# Patient Record
Sex: MALE | Race: OTHER | NOT HISPANIC OR LATINO
[De-identification: names, ages, dates, MRNs, and addresses within clinical notes are randomized per-mention and may not be internally consistent; named-entity substitution may affect disease eponyms.]

---

## 2019-10-11 ENCOUNTER — APPOINTMENT (OUTPATIENT)
Dept: UROLOGY | Facility: CLINIC | Age: 69
End: 2019-10-11
Payer: MEDICARE

## 2019-10-11 VITALS
SYSTOLIC BLOOD PRESSURE: 130 MMHG | DIASTOLIC BLOOD PRESSURE: 80 MMHG | BODY MASS INDEX: 23.7 KG/M2 | WEIGHT: 160 LBS | HEIGHT: 69 IN

## 2019-10-11 DIAGNOSIS — Z80.42 FAMILY HISTORY OF MALIGNANT NEOPLASM OF PROSTATE: ICD-10-CM

## 2019-10-11 DIAGNOSIS — Z80.43 FAMILY HISTORY OF MALIGNANT NEOPLASM OF TESTIS: ICD-10-CM

## 2019-10-11 DIAGNOSIS — Z80.8 FAMILY HISTORY OF MALIGNANT NEOPLASM OF OTHER ORGANS OR SYSTEMS: ICD-10-CM

## 2019-10-11 DIAGNOSIS — Z78.9 OTHER SPECIFIED HEALTH STATUS: ICD-10-CM

## 2019-10-11 PROBLEM — Z00.00 ENCOUNTER FOR PREVENTIVE HEALTH EXAMINATION: Status: ACTIVE | Noted: 2019-10-11

## 2019-10-11 PROCEDURE — 99202 OFFICE O/P NEW SF 15 MIN: CPT

## 2019-10-14 ENCOUNTER — FORM ENCOUNTER (OUTPATIENT)
Age: 69
End: 2019-10-14

## 2019-10-15 ENCOUNTER — APPOINTMENT (OUTPATIENT)
Dept: ULTRASOUND IMAGING | Facility: CLINIC | Age: 69
End: 2019-10-15
Payer: MEDICARE

## 2019-10-15 ENCOUNTER — OUTPATIENT (OUTPATIENT)
Dept: OUTPATIENT SERVICES | Facility: HOSPITAL | Age: 69
LOS: 1 days | End: 2019-10-15

## 2019-10-15 PROCEDURE — 93975 VASCULAR STUDY: CPT | Mod: 26

## 2019-10-15 PROCEDURE — 76870 US EXAM SCROTUM: CPT | Mod: 26

## 2019-10-18 ENCOUNTER — APPOINTMENT (OUTPATIENT)
Dept: UROLOGY | Facility: CLINIC | Age: 69
End: 2019-10-18

## 2019-10-18 LAB
PSA FREE FLD-MCNC: 11 %
PSA FREE SERPL-MCNC: 2.24 NG/ML
PSA SERPL-MCNC: 20.4 NG/ML

## 2019-10-30 ENCOUNTER — FORM ENCOUNTER (OUTPATIENT)
Age: 69
End: 2019-10-30

## 2019-10-31 ENCOUNTER — APPOINTMENT (OUTPATIENT)
Dept: MRI IMAGING | Facility: HOSPITAL | Age: 69
End: 2019-10-31
Payer: MEDICARE

## 2019-10-31 ENCOUNTER — OUTPATIENT (OUTPATIENT)
Dept: OUTPATIENT SERVICES | Facility: HOSPITAL | Age: 69
LOS: 1 days | End: 2019-10-31
Payer: MEDICARE

## 2019-10-31 PROCEDURE — A9585: CPT

## 2019-10-31 PROCEDURE — 72197 MRI PELVIS W/O & W/DYE: CPT

## 2019-10-31 PROCEDURE — 72197 MRI PELVIS W/O & W/DYE: CPT | Mod: 26

## 2019-11-15 ENCOUNTER — APPOINTMENT (OUTPATIENT)
Dept: UROLOGY | Facility: CLINIC | Age: 69
End: 2019-11-15
Payer: MEDICARE

## 2019-11-15 PROCEDURE — 99213 OFFICE O/P EST LOW 20 MIN: CPT

## 2019-11-15 NOTE — HISTORY OF PRESENT ILLNESS
[FreeTextEntry1] : 69 year old retired  \par s.p urolift Dr Balwinder Diez\par Has helped him tremendously\par commplaining of left testicular swelling\par 4-5 x bigger than rigtht testicle\par not painful; stable for months\par \par INTERPRETOR BA [id number]\par \par 11.15.2019\par interpretor yinka [id number]\par \par

## 2019-11-15 NOTE — ASSESSMENT
[FreeTextEntry1] : MRI 74.3 cc\par PSA density is 0.27\par reviewed history \par last biopsy 8 months ago PSA at that time 15 or 16\par Needs records from Dr Chambers\par \par 1. repeat PSA if similar to PSA at time of biopsy continue to monitor\par 2. if PSA has risen from PSA at time of biopsy will repeat\par 3. We discussed the risks and complications of prostate biopsy and discussed the procedure.\par We discussed that procedure is performed by placing a rectal probe and administering anesthesia locally.\par Biopsies are then taken with a needle. Multiple biopsies are taken, \par Risks of the procedure include hematuria, hematospermia, blood per rectum.\par Risks include infection, sepsis and even death.\par We discussed perineal vs transrectal biopsy\par We discussed decreased risk of perineal biopsy vs transrectal but associated greater discomfort \par We discussed optiions of: 1. local, 2. IV sedation in Operating room 3. po Valium\par Fleets enema should be taken.\par ASA and NSAID must be stopped 1 week prior to procedure.\par \par 4. if stable proceed with hydrocele left\par we discussed risks and complications of hydrocelectomy\par we discussed proceeding if no need for biopsy of prostate based upon review of prior records and repeat PSA determination\par No treatment is necessary unless the patient is uncomfortable and is bothered by it. He wishes to proceed  pending above

## 2019-11-21 LAB
PSA FREE FLD-MCNC: 15 %
PSA FREE SERPL-MCNC: 2.42 NG/ML
PSA SERPL-MCNC: 16.6 NG/ML

## 2019-12-09 ENCOUNTER — APPOINTMENT (OUTPATIENT)
Dept: UROLOGY | Facility: CLINIC | Age: 69
End: 2019-12-09
Payer: MEDICARE

## 2019-12-09 DIAGNOSIS — R37 SEXUAL DYSFUNCTION, UNSPECIFIED: ICD-10-CM

## 2019-12-09 DIAGNOSIS — R97.20 ELEVATED PROSTATE, SPECIFIC ANTIGEN [PSA]: ICD-10-CM

## 2019-12-09 PROCEDURE — 99213 OFFICE O/P EST LOW 20 MIN: CPT

## 2019-12-09 NOTE — ASSESSMENT
[FreeTextEntry1] : MRI 74.3 cc\par PSA density is 0.27\par reviewed history \par last biopsy 8 months ago PSA at that time 15 or 16\par Needs records from Dr Chambers\par AGAIN reviewed with  patient need to have prior results\par Concerned re PSA density\par \par \par 1. repeat PSA if similar to PSA at time of biopsy continue to monitor\par 2. if PSA has risen from PSA at time of biopsy will repeat\par 3. We discussed the risks and complications of prostate biopsy and discussed the procedure.\par We discussed that procedure is performed by placing a rectal probe and administering anesthesia locally.\par Biopsies are then taken with a needle. Multiple biopsies are taken, \par Risks of the procedure include hematuria, hematospermia, blood per rectum.\par Risks include infection, sepsis and even death.\par We discussed perineal vs transrectal biopsy\par We discussed decreased risk of perineal biopsy vs transrectal but associated greater discomfort \par We discussed optiions of: 1. local, 2. IV sedation in Operating room 3. po Valium\par Fleets enema should be taken.\par ASA and NSAID must be stopped 1 week prior to procedure.\par \par .\par We again reviewed that the PSA data from prior biopsies needs to be reviewed.\par I instructed patient to go to Dr Sharpe office to get labs and return with them\par \par He wants to proceed with hydrocelectomy.\par Will defer until PSA resolved.\par \par Now complaining of loss of sexual interest.  At last visit, he stated he was doing well.\par Will proceed with endocrine studies\par Will return to review with prior PSA data.\par \par \par

## 2019-12-09 NOTE — HISTORY OF PRESENT ILLNESS
[FreeTextEntry1] : 69 year old retired  \par s.p urolift Dr Balwinder Diez\par Has helped him tremendously\par commplaining of left testicular swelling\par 4-5 x bigger than rigtht testicle\par not painful; stable for months\par \par INTERPRETOR BA [id number]\par \par 11.15.2019\par interpretor yinka [id number]\par \par \par 12.9.2019\par [id number] Davey\par

## 2019-12-12 LAB
PROLACTIN SERPL-MCNC: 10.8 NG/ML
TESTOST SERPL-MCNC: 265 NG/DL

## 2019-12-15 LAB
4K SCORE CALCULATION: 5 %
FREE PSA: 2.04 NG/ML
PERCENT FREE PSA: 12 %
TOTAL PSA: 17.56 NG/ML

## 2020-01-10 ENCOUNTER — APPOINTMENT (OUTPATIENT)
Dept: UROLOGY | Facility: CLINIC | Age: 70
End: 2020-01-10

## 2020-02-07 ENCOUNTER — APPOINTMENT (OUTPATIENT)
Dept: UROLOGY | Facility: CLINIC | Age: 70
End: 2020-02-07
Payer: MEDICARE

## 2020-02-07 DIAGNOSIS — N43.3 HYDROCELE, UNSPECIFIED: ICD-10-CM

## 2020-02-07 DIAGNOSIS — Z87.898 PERSONAL HISTORY OF OTHER SPECIFIED CONDITIONS: ICD-10-CM

## 2020-02-07 PROCEDURE — 99214 OFFICE O/P EST MOD 30 MIN: CPT

## 2020-02-07 NOTE — ASSESSMENT
[FreeTextEntry1] : MRI 74.3 cc\par PSA density is 0.27\par reviewed history \par last biopsy 8 months ago PSA at that time 15 or 16\par Needs records from Dr Chambers\par AGAIN reviewed with  patient need to have prior results\par Concerned re PSA density\par \par .\par We again reviewed that the PSA data from prior biopsies needs to be reviewed.\par I instructed patient to go to Dr Sharpe office to get labs and return with them\par These outside records were reviewed with patient\par  Linda\par [id number]\par \par 11.2018  PSA 15.5\par 5.2018 PSA 15.9\par Prostate Biopsy  12/2017 benign\par 3.2018  PSA 15.9\par 10/2017 PSA 16.8\par \par Current \par \par 11/2019 16.60\par 4k low risk of prostate cancer\par MRI no suspicious lesion \par However PSA density high on PSA of 20.40\par Repeat 16.60 decreased density of 0.22\par We discussed proceeding with repeat biopsy in light of the high PSA density.  The patient states that not only did he have a biopsy by Dr. walsh.  He had a biopsy previously in [place].  He does not wish to undergo another biopsy.  He is aware of the risk of delaying diagnosis of prostate cancer.  He understands the concern.  He wished to proceed with hydrocelectomy at this time.\par \par He wants to proceed with hydrocelectomy.\par We discussed at length the risks and complications of hydrocelectomy as well as the postoperative course.  A diagram was demonstrated to the patient all questions were answered.  He wished to proceed.  He was instructed that he needs to have an escort with him the day of surgery.  He was instructed that he needs medical clearance.  It was emphasized that he hydrocelectomy would not improve his sexual function other than discomfort relating to the hydrocele. The patient expressed fully understanding of the information provided, the consequences and the management.\par \par reivewed risks and complications and indications\par reviewed medical clearance requirred\par posteoperattive coursre

## 2020-02-07 NOTE — HISTORY OF PRESENT ILLNESS
[FreeTextEntry1] :  Linda\par [id number]\par 11.2018 15.5\par 5.2018 15.9\par Prostate Biopsy  12/2017 benign\par 3.2018 15.9\par 10/2017 16.8\par \par Current \par \par 11/2019 16.60\par 4K low risk of prostate cancer\par MRI no suspicious lesion \par However PSA density high on PSA of 20.40\par Repeat 16.60 decreased density of 0.22

## 2020-03-23 ENCOUNTER — APPOINTMENT (OUTPATIENT)
Dept: UROLOGY | Facility: AMBULATORY SURGERY CENTER | Age: 70
End: 2020-03-23

## 2020-07-07 ENCOUNTER — APPOINTMENT (OUTPATIENT)
Dept: UROLOGY | Facility: AMBULATORY SURGERY CENTER | Age: 70
End: 2020-07-07

## 2022-04-11 PROBLEM — R37 SEXUAL DYSFUNCTION: Status: ACTIVE | Noted: 2019-10-11
